# Patient Record
Sex: FEMALE | Race: WHITE | Employment: FULL TIME | URBAN - METROPOLITAN AREA
[De-identification: names, ages, dates, MRNs, and addresses within clinical notes are randomized per-mention and may not be internally consistent; named-entity substitution may affect disease eponyms.]

---

## 2017-02-10 PROBLEM — R09.81 NASAL CONGESTION: Status: ACTIVE | Noted: 2017-02-10

## 2017-02-10 PROBLEM — J34.2 NASAL SEPTAL DEVIATION: Status: ACTIVE | Noted: 2017-02-10

## 2017-09-15 PROBLEM — N32.81 OAB (OVERACTIVE BLADDER): Status: ACTIVE | Noted: 2017-09-15

## 2017-09-15 PROBLEM — N30.10 IC (INTERSTITIAL CYSTITIS): Status: ACTIVE | Noted: 2017-09-15

## 2018-02-16 PROCEDURE — 82607 VITAMIN B-12: CPT | Performed by: INTERNAL MEDICINE

## 2018-02-16 PROCEDURE — 83721 ASSAY OF BLOOD LIPOPROTEIN: CPT | Performed by: INTERNAL MEDICINE

## 2018-02-16 PROCEDURE — 82746 ASSAY OF FOLIC ACID SERUM: CPT | Performed by: INTERNAL MEDICINE

## 2018-03-09 PROBLEM — R09.82 POST-NASAL DRIP: Status: ACTIVE | Noted: 2018-03-09

## 2019-02-22 PROCEDURE — 83721 ASSAY OF BLOOD LIPOPROTEIN: CPT | Performed by: INTERNAL MEDICINE

## 2019-08-01 PROCEDURE — 88175 CYTOPATH C/V AUTO FLUID REDO: CPT | Performed by: OBSTETRICS & GYNECOLOGY

## 2021-04-28 PROBLEM — R09.82 POST-NASAL DRIP: Status: RESOLVED | Noted: 2018-03-09 | Resolved: 2021-04-28

## 2021-04-28 PROBLEM — R09.81 NASAL CONGESTION: Status: RESOLVED | Noted: 2017-02-10 | Resolved: 2021-04-28

## 2025-01-26 ENCOUNTER — HOSPITAL ENCOUNTER (EMERGENCY)
Facility: HOSPITAL | Age: 45
Discharge: HOME | End: 2025-01-27
Attending: STUDENT IN AN ORGANIZED HEALTH CARE EDUCATION/TRAINING PROGRAM
Payer: COMMERCIAL

## 2025-01-26 ENCOUNTER — APPOINTMENT (OUTPATIENT)
Dept: CT IMAGING | Facility: HOSPITAL | Age: 45
End: 2025-01-26
Attending: STUDENT IN AN ORGANIZED HEALTH CARE EDUCATION/TRAINING PROGRAM
Payer: COMMERCIAL

## 2025-01-26 DIAGNOSIS — R51.9 NONINTRACTABLE HEADACHE, UNSPECIFIED CHRONICITY PATTERN, UNSPECIFIED HEADACHE TYPE: Primary | ICD-10-CM

## 2025-01-26 LAB
ALBUMIN SERPL-MCNC: 4 G/DL (ref 3.2–4.8)
ALBUMIN/GLOB SERPL: 1.3 {RATIO} (ref 1–2)
ALP LIVER SERPL-CCNC: 39 U/L
ALT SERPL-CCNC: <7 U/L
ANION GAP SERPL CALC-SCNC: 6 MMOL/L (ref 0–18)
AST SERPL-CCNC: 21 U/L (ref ?–34)
BASOPHILS # BLD AUTO: 0.05 X10(3) UL (ref 0–0.2)
BASOPHILS NFR BLD AUTO: 0.7 %
BILIRUB SERPL-MCNC: 0.4 MG/DL (ref 0.3–1.2)
BUN BLD-MCNC: 14 MG/DL (ref 9–23)
CALCIUM BLD-MCNC: 9.3 MG/DL (ref 8.7–10.6)
CHLORIDE SERPL-SCNC: 104 MMOL/L (ref 98–112)
CO2 SERPL-SCNC: 28 MMOL/L (ref 21–32)
CREAT BLD-MCNC: 0.79 MG/DL
EGFRCR SERPLBLD CKD-EPI 2021: 93 ML/MIN/1.73M2 (ref 60–?)
EOSINOPHIL # BLD AUTO: 0.11 X10(3) UL (ref 0–0.7)
EOSINOPHIL NFR BLD AUTO: 1.6 %
ERYTHROCYTE [DISTWIDTH] IN BLOOD BY AUTOMATED COUNT: 12.6 %
GLOBULIN PLAS-MCNC: 3.1 G/DL (ref 2–3.5)
GLUCOSE BLD-MCNC: 122 MG/DL (ref 70–99)
HCT VFR BLD AUTO: 38 %
HGB BLD-MCNC: 13 G/DL
IMM GRANULOCYTES # BLD AUTO: 0.01 X10(3) UL (ref 0–1)
IMM GRANULOCYTES NFR BLD: 0.1 %
LYMPHOCYTES # BLD AUTO: 2.97 X10(3) UL (ref 1–4)
LYMPHOCYTES NFR BLD AUTO: 42.6 %
MCH RBC QN AUTO: 30.2 PG (ref 26–34)
MCHC RBC AUTO-ENTMCNC: 34.2 G/DL (ref 31–37)
MCV RBC AUTO: 88.2 FL
MONOCYTES # BLD AUTO: 0.78 X10(3) UL (ref 0.1–1)
MONOCYTES NFR BLD AUTO: 11.2 %
NEUTROPHILS # BLD AUTO: 3.05 X10 (3) UL (ref 1.5–7.7)
NEUTROPHILS # BLD AUTO: 3.05 X10(3) UL (ref 1.5–7.7)
NEUTROPHILS NFR BLD AUTO: 43.8 %
OSMOLALITY SERPL CALC.SUM OF ELEC: 288 MOSM/KG (ref 275–295)
PLATELET # BLD AUTO: 314 10(3)UL (ref 150–450)
POTASSIUM SERPL-SCNC: 3.8 MMOL/L (ref 3.5–5.1)
PROT SERPL-MCNC: 7.1 G/DL (ref 5.7–8.2)
RBC # BLD AUTO: 4.31 X10(6)UL
SODIUM SERPL-SCNC: 138 MMOL/L (ref 136–145)
WBC # BLD AUTO: 7 X10(3) UL (ref 4–11)

## 2025-01-26 PROCEDURE — 62270 DX LMBR SPI PNXR: CPT

## 2025-01-26 PROCEDURE — 89050 BODY FLUID CELL COUNT: CPT | Performed by: STUDENT IN AN ORGANIZED HEALTH CARE EDUCATION/TRAINING PROGRAM

## 2025-01-26 PROCEDURE — 80053 COMPREHEN METABOLIC PANEL: CPT | Performed by: STUDENT IN AN ORGANIZED HEALTH CARE EDUCATION/TRAINING PROGRAM

## 2025-01-26 PROCEDURE — 82945 GLUCOSE OTHER FLUID: CPT | Performed by: STUDENT IN AN ORGANIZED HEALTH CARE EDUCATION/TRAINING PROGRAM

## 2025-01-26 PROCEDURE — 87070 CULTURE OTHR SPECIMN AEROBIC: CPT | Performed by: STUDENT IN AN ORGANIZED HEALTH CARE EDUCATION/TRAINING PROGRAM

## 2025-01-26 PROCEDURE — 87205 SMEAR GRAM STAIN: CPT | Performed by: STUDENT IN AN ORGANIZED HEALTH CARE EDUCATION/TRAINING PROGRAM

## 2025-01-26 PROCEDURE — 96375 TX/PRO/DX INJ NEW DRUG ADDON: CPT

## 2025-01-26 PROCEDURE — 96374 THER/PROPH/DIAG INJ IV PUSH: CPT

## 2025-01-26 PROCEDURE — 70498 CT ANGIOGRAPHY NECK: CPT | Performed by: STUDENT IN AN ORGANIZED HEALTH CARE EDUCATION/TRAINING PROGRAM

## 2025-01-26 PROCEDURE — 99285 EMERGENCY DEPT VISIT HI MDM: CPT

## 2025-01-26 PROCEDURE — 85025 COMPLETE CBC W/AUTO DIFF WBC: CPT | Performed by: STUDENT IN AN ORGANIZED HEALTH CARE EDUCATION/TRAINING PROGRAM

## 2025-01-26 PROCEDURE — 70496 CT ANGIOGRAPHY HEAD: CPT | Performed by: STUDENT IN AN ORGANIZED HEALTH CARE EDUCATION/TRAINING PROGRAM

## 2025-01-26 PROCEDURE — 84157 ASSAY OF PROTEIN OTHER: CPT | Performed by: STUDENT IN AN ORGANIZED HEALTH CARE EDUCATION/TRAINING PROGRAM

## 2025-01-26 RX ORDER — LIDOCAINE HYDROCHLORIDE 10 MG/ML
INJECTION, SOLUTION INFILTRATION; PERINEURAL
Status: COMPLETED
Start: 2025-01-26 | End: 2025-01-26

## 2025-01-26 RX ORDER — ACETAMINOPHEN 500 MG
1000 TABLET ORAL ONCE
Status: COMPLETED | OUTPATIENT
Start: 2025-01-26 | End: 2025-01-26

## 2025-01-26 RX ORDER — METOCLOPRAMIDE HYDROCHLORIDE 5 MG/ML
10 INJECTION INTRAMUSCULAR; INTRAVENOUS ONCE
Status: COMPLETED | OUTPATIENT
Start: 2025-01-26 | End: 2025-01-26

## 2025-01-26 RX ORDER — NORGESTIMATE AND ETHINYL ESTRADIOL 0.25-0.035
1 KIT ORAL DAILY
COMMUNITY
Start: 2024-11-03 | End: 2025-01-29 | Stop reason: ALTCHOICE

## 2025-01-26 RX ORDER — DIPHENHYDRAMINE HYDROCHLORIDE 50 MG/ML
25 INJECTION INTRAMUSCULAR; INTRAVENOUS ONCE
Status: COMPLETED | OUTPATIENT
Start: 2025-01-26 | End: 2025-01-26

## 2025-01-26 RX ORDER — LIDOCAINE HYDROCHLORIDE AND EPINEPHRINE 10; 10 MG/ML; UG/ML
INJECTION, SOLUTION INFILTRATION; PERINEURAL
Status: COMPLETED
Start: 2025-01-26 | End: 2025-01-26

## 2025-01-27 VITALS
RESPIRATION RATE: 18 BRPM | OXYGEN SATURATION: 100 % | HEART RATE: 63 BPM | SYSTOLIC BLOOD PRESSURE: 112 MMHG | HEIGHT: 65 IN | BODY MASS INDEX: 19.99 KG/M2 | TEMPERATURE: 98 F | WEIGHT: 120 LBS | DIASTOLIC BLOOD PRESSURE: 72 MMHG

## 2025-01-27 LAB
CLARITY CSF: CLEAR
CLARITY CSF: CLEAR
COLOR CSF: COLORLESS
COLOR CSF: COLORLESS
COUNT PERFORMED ON TUBE: 1
COUNT PERFORMED ON TUBE: 4
GLUCOSE CSF-MCNC: 57 MG/DL (ref 40–70)
PROT PATTERN CSF ELPH-IMP: 39 MG/DL (ref 15–45)
RBC # CSF: 0 /MM3
RBC # CSF: 2 /MM3 (ref ?–1)
TOTAL CELLS COUNTED CSF: 0 /MM3 (ref 0–5)
TOTAL VOLUME CSF: 9 ML

## 2025-01-27 NOTE — ED PROVIDER NOTES
Patient Seen in: [facility] Emergency Department      History     Chief Complaint   Patient presents with    Headache     Stated Complaint: Severe headache, sudden for second time ( 1st time yesterday night, resolved)    Subjective:   HPI      The patient is a 45-year-old female presented to the emergency with reports of headache.  She states she first developed a sudden onset severe headache as she was having sex with her  yesterday.  It was a 7 out of 10 at that time.  Today headache reoccurred again while having sex and was 10 out of 10.  She denies any neurological symptoms.  She denies prior history of headaches or migraines.  She states she has never felt this way before.    Objective:     Past Medical History:    ANXIETY    Anxiety    Depression    IBS              Past Surgical History:   Procedure Laterality Date    Adenoidectomy      Colonoscopy  ,     wnl per pt    Other      interstitial cystitis    Tonsillectomy                  Social History     Socioeconomic History    Marital status:    Tobacco Use    Smoking status: Former     Current packs/day: 0.00     Average packs/day: 1 pack/day for 7.0 years (7.0 ttl pk-yrs)     Types: Cigarettes     Start date: 1997     Quit date: 2004     Years since quittin.0    Smokeless tobacco: Never   Substance and Sexual Activity    Alcohol use: Yes     Alcohol/week: 0.0 standard drinks of alcohol     Comment: socially    Drug use: No    Sexual activity: Yes                  Physical Exam     ED Triage Vitals [25 1857]   /75   Pulse 81   Resp 18   Temp 98 °F (36.7 °C)   Temp src Temporal   SpO2 98 %   O2 Device None (Room air)       Current Vitals:   Vital Signs  BP: 112/72  Pulse: 63  Resp: 18  Temp: 98 °F (36.7 °C)  Temp src: Temporal  MAP (mmHg): 83    Oxygen Therapy  SpO2: 100 %  O2 Device: None (Room air)        Physical Exam  Vitals and nursing note reviewed.   Constitutional:       General: She is not in  acute distress.     Appearance: Normal appearance.   HENT:      Head: Normocephalic.      Nose: Nose normal.      Mouth/Throat:      Mouth: Mucous membranes are moist.   Eyes:      Extraocular Movements: Extraocular movements intact.      Pupils: Pupils are equal, round, and reactive to light.   Cardiovascular:      Rate and Rhythm: Normal rate and regular rhythm.      Pulses: Normal pulses.   Pulmonary:      Effort: Pulmonary effort is normal.   Abdominal:      General: Abdomen is flat. Bowel sounds are normal. There is no distension.      Palpations: Abdomen is soft.      Tenderness: There is no abdominal tenderness. There is no right CVA tenderness, left CVA tenderness, guarding or rebound.      Hernia: No hernia is present.   Musculoskeletal:         General: No swelling or tenderness. Normal range of motion.      Cervical back: Normal range of motion.   Skin:     General: Skin is warm and dry.   Neurological:      Mental Status: She is alert and oriented to person, place, and time. Mental status is at baseline.   Psychiatric:         Mood and Affect: Mood normal.             ED Course     Labs Reviewed   COMP METABOLIC PANEL (14) - Abnormal; Notable for the following components:       Result Value    Glucose 122 (*)     ALT <7 (*)     All other components within normal limits   CELL COUNT, CSF - Abnormal; Notable for the following components:    RBC CSF 2 (*)     All other components within normal limits    Narrative:      Fluid cell count is less than 4/mm3. Differential is not indicated.   GLUCOSE, CEREBROSPINAL FLUID - Normal   PROTEIN, TOTAL, CSF - Normal   CBC WITH DIFFERENTIAL WITH PLATELET   CSF RBC TUBE 1   CSF CULTURE            CTA BRAIN + CTA CAROTIDS (CPT=70496/49545)    Result Date: 1/26/2025  CONCLUSION:  No significant midline shift or mass effect.  No acute intracranial hemorrhage.  If there is persistent clinical concern then consider MRI.  No evidence of large vessel intracranial arterial  occlusion.  No evidence of hemodynamically significant stenosis at either carotid bifurcation based on NASCET criteria.     LOCATION:  [facility]   Dictated by (CST): Garfield Sadler MD on 1/26/2025 at 9:12 PM     Finalized by (CST): Garfield Sadler MD on 1/26/2025 at 9:17 PM            The patient requires an LP to rule out subarachnoid hemorrhage.  Informed consent was obtained.  The standard timeout procedure was completed.  The patient was prepped and draped in sterile fashion.  Landmarks were identified.  Patient's skin was anesthetized with local anesthesia using 2 mL lido with epi.  Needle was not advanced but patient states that she felt warm like she was going to vomit and also folic she needed to have a bowel movement.  Procedure was then halted and patient went to the restroom and had a bowel movement.     The patient was then repositioned.  The prior was used to prep her back was cleaned off with chlorhexidine.  Landmarks were identified again.  The patient was prepped and draped in sterile fashion once again.   Local anesthesia was achieved with 1% Lidocaine.  The ER LP tray was used. A Spinal needed was inserted into a lumbar interspinous space and spinal fluid was drained.  Approximately 4 ml’s of CSF were obtained and sent to the lab.  The fluid appeared clear.  The needle was withdrawn.  There were no complications related to the procedure and the patient tolerated the procedure well.         MDM      Medical Decision Making  The differential includes the following  Post-coital headache, SAH which is a life threat, migraine, anxiety       Pertinent comorbidities include  As listed above     Pertinent social history includes  As listed     Labs  Wbc normal   Hemoglobin 13  Cmp unremarkable     Imaging studies  I reviewed the images and my independent interpreation after review is no evidence of intracranial hemorrhage. Additionaly, I reviewd the radiology report that states the following as detailed  above    External data reviewed    Discussion of management with external providers    ER course  Febrile hemodynamically stable here.  She has no focal neurological deficits on her physical exam.  She was given Reglan and Benadryl with some relief of her pain but she states she was sleeping and now she is awake she still has the discomfort.  Her CT angio was negative for any signs of bleeding or an aneurysm however patient is out of the 6-hour timeframe where CT scan would definitively rule out evidence of intracranial hemorrhage from a subarachnoid bleed.  A long discussion was held with the patient regarding an LP and ultimately shared decision made to perform the procedure.    LP was performed with the above-noted episode as detailed above in the procedure portion of this note.  Currently awaiting results.    12:14 AM  LP negative. Discussed the result with the patient and her . They have been given strict return precautions.       Disposition and Plan     Clinical Impression:  1. Nonintractable headache, unspecified chronicity pattern, unspecified headache type         Disposition:  Discharge  1/27/2025 12:11 am    Follow-up:  Nico Shaw MD  [address] [place] [address] [id number] [phone]    Follow up            Medications Prescribed:  Current Discharge Medication List              Supplementary Documentation:

## 2025-01-29 ENCOUNTER — OFFICE VISIT (OUTPATIENT)
Dept: NEUROLOGY | Facility: CLINIC | Age: 45
End: 2025-01-29
Payer: COMMERCIAL

## 2025-01-29 VITALS
SYSTOLIC BLOOD PRESSURE: 116 MMHG | BODY MASS INDEX: 19 KG/M2 | RESPIRATION RATE: 16 BRPM | DIASTOLIC BLOOD PRESSURE: 64 MMHG | HEART RATE: 76 BPM | WEIGHT: 117 LBS

## 2025-01-29 DIAGNOSIS — G44.82 COITAL HEADACHE: Primary | ICD-10-CM

## 2025-01-29 PROCEDURE — 3078F DIAST BP <80 MM HG: CPT | Performed by: OTHER

## 2025-01-29 PROCEDURE — 99204 OFFICE O/P NEW MOD 45 MIN: CPT | Performed by: OTHER

## 2025-01-29 PROCEDURE — 3074F SYST BP LT 130 MM HG: CPT | Performed by: OTHER

## 2025-01-29 RX ORDER — DESVENLAFAXINE 25 MG/1
TABLET, EXTENDED RELEASE ORAL
COMMUNITY
Start: 2025-01-28

## 2025-01-29 RX ORDER — RIZATRIPTAN BENZOATE 10 MG/1
TABLET ORAL
Qty: 12 TABLET | Refills: 2 | Status: SHIPPED | OUTPATIENT
Start: 2025-01-29

## 2025-01-29 RX ORDER — NORETHINDRONE ACETATE AND ETHINYL ESTRADIOL AND FERROUS FUMARATE 1MG-20(21)
KIT ORAL
COMMUNITY
Start: 2024-10-18

## 2025-01-29 NOTE — PATIENT INSTRUCTIONS
After your visit at the [facility] today,  please direct any follow up questions or medication needs to the staff in our [place] office so that your concerns may be promptly addressed.  We are available through [employer] or at the numbers below:    The phone number is:   [phone] option #1    The fax number is:  [phone]    Your pharmacy should also send any requests electronically to the [place] office.  Refill policies:    Allow 2-3 business days for refills; controlled substances may take longer.  Contact your pharmacy at least 5 days prior to running out of medication and have them send an electronic request or submit request through the “request refill” option in your [employer] account.  Refills are not addressed on weekends; covering physicians do not authorize routine medications on weekends.  No narcotics or controlled substances are refilled after noon on Fridays or by on call physicians.  By law, narcotics must be electronically prescribed.  A 30 day supply with no refills is the maximum allowed.  If your prescription is due for a refill, you may be due for a follow up appointment.  To best provide you care, patients receiving routine medications need to be seen at least once a year.  Patients receiving narcotic/controlled substance medications need to be seen at least once every 3 months.  In the event that your preferred pharmacy does not have the requested medication in stock (e.g. Backordered), it is your responsibility to find another pharmacy that has the requested medication available.  We will gladly send a new prescription to that pharmacy at your request.    Scheduling Tests:    If your physician has ordered radiology tests such as MRI or CT scans, please contact Central Scheduling at [phone] right away to schedule the test.  Once scheduled, the [facility] Centralized Referral Team will work with your insurance carrier to obtain pre-certification or prior authorization.   Depending on your insurance carrier, approval may take 3-10 days.  It is highly recommended patients assure they have received an authorization before having a test performed.  If test is done without insurance authorization, patient may be responsible for the entire amount billed.      Precertification and Prior Authorizations:  If your physician has recommended that you have a procedure or additional testing performed the [facility] Centralized Referral Team will contact your insurance carrier to obtain pre-certification or prior authorization.    You are strongly encouraged to contact your insurance carrier to verify that your procedure/test has been approved and is a COVERED benefit.  Although the [facility] Centralized Referral Team does its due diligence, the insurance carrier gives the disclaimer that \"Although the procedure is authorized, this does not guarantee payment.\"    Ultimately the patient is responsible for payment.   Thank you for your understanding in this matter.  Paperwork Completion:  If you require FMLA or disability paperwork for your recovery, please make sure to either drop it off or have it faxed to our office at [phone]. Be sure the form has your name and date of birth on it.  The form will be faxed to our Forms Department and they will complete it for you.  There is a 25$ fee for all forms that need to be filled out.  Please be aware there is a 10-14 day turnaround time.  You will need to sign a release of information (LESLYE) form if your paperwork does not come with one.  You may call the Forms Department with any questions at [phone].  Their fax number is [phone].

## 2025-01-29 NOTE — H&P
Neurology H&P    Wyatt Kerr Patient Status:  No patient class for patient encounter    1980 MRN [id number]   Location [facility], [address] [place] [address] Attending No att. providers found   Hosp Day # 0 PCP Teo Guevara MD     Subjective:  Wyatt Kerr is a(n) 45 year old female with anxiety comes to neurology clinic for evaluation of headaches. She states that 4 days ago she was having intercourse with her  and started to get a headache. This was like a ring around her head. And the top of the head.This was about an 8/10 on the pain scale,. She had some sensitivity to light with this. She had no nausea or vomiting but felt \"off\". She layed down for 45 minutes and it resolved. She states that the next day she and her  tried again and the headache came back. She went to the ED and got a CTA of the head and neck which was normal as reported below. This was also a painful headache and got a \"migraine cocktail\" which helped reduce her pain but did not completely resolve her headaches. She has no h/o migraine. She has a father and sister with migraines. She did just start taking birthcontrol and desvenlafaxine. She denies any vision changes or numbness,weakness or tingling     Current Medications:  Current Outpatient Medications   Medication Sig Dispense Refill    desvenlafaxine ER 25 MG Oral Tablet 24 Hr       AUROVELA FE  1-20 MG-MCG Oral Tab ONE PILL BY MOUTH DAILY CONTINUOUS DOSING.      cetirizine 10 MG Oral Tab Take 1 tablet (10 mg total) by mouth daily.         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    Chronic allergic conjunctivitis    Reactive airway disease (HCC)    Displacement of lumbar intervertebral disc without myelopathy    Health care maintenance    Visit for screening mammogram     Nasal septal deviation    IC (interstitial cystitis)    OAB (overactive bladder)       PMHx:  Past Medical History:    ANXIETY    Anxiety    Depression    IBS       PSHx:  Past Surgical History:   Procedure Laterality Date    Adenoidectomy      Colonoscopy  ,     wnl per pt    Other      interstitial cystitis    Tonsillectomy         SocHx:  Social History     Socioeconomic History    Marital status:    Tobacco Use    Smoking status: Former     Current packs/day: 0.00     Average packs/day: 1 pack/day for 7.0 years (7.0 ttl pk-yrs)     Types: Cigarettes     Start date: 1997     Quit date: 2004     Years since quittin.0    Smokeless tobacco: Never   Vaping Use    Vaping status: Never Used   Substance and Sexual Activity    Alcohol use: Yes     Alcohol/week: 0.0 standard drinks of alcohol     Comment: socially    Drug use: No    Sexual activity: Yes   Other Topics Concern    Caffeine Concern Yes    Exercise Yes     Comment: 2-3 times per week, running       Family History:  Family History   Problem Relation Age of Onset    Gastro-Intestinal Disorder Mother     Breast Cancer Mother 50        DX AGE 50    Other (Other) Mother     Hypertension Father     Kidney Disease Father         stones    Other (Other) Father     Hypertension Maternal Grandmother     Cancer Maternal Grandmother         appendicele    Heart Disease Paternal Grandmother     Diabetes Paternal Grandmother     Stroke Paternal Grandmother     Heart Disease Paternal Grandfather     Cancer Paternal Grandfather         pancreatic    Heart Disorder Sister         MVP           ROS:  10 point ROS completed and was negative, except for pertinent positive and negatives stated in subjective.    Objective/Physical Exam:    Vital Signs:  Blood pressure 116/64, pulse 76, resp. rate 16, weight 117 lb (53.1 kg), last menstrual period 2024, not currently breastfeeding.    Gen: Awake and in no apparent distress  HEENT: moist  mucus membranes  Neck: Supple  Cardiovascular: Regular rate and rhythm, no murmur  Pulm: CTAB  GI: non-tender, normal bowel sounds  Skin: normal, dry  Extremities: No clubbing or cyanosis      Neurologic:   MENTAL STATUS: alert, ox3, normal attention, language and fund of knowledge.      CRANIAL NERVES II to XII: PERRLA, no ptosis or diplopia, EOM intact, facial sensation intact, strong eye closure, face is symmetric, no dysarthria, tongue midline,  no tongue fasciculations or atrophy, strong shoulder shrug.    MOTOR EXAMINATION: normal tone, no fasciculations, normal strength throughout in UEs and LEs      SENSORY EXAMINATION:  UE: intact to light touch, pinprick intact  LE: intact to light touch, pinprick intact    COORDINATION:  No dysmetria, or intention tremors     REFLEXES: 2+ at biceps, 2+ brachioradialis, 2+ at patella     GAIT: normal stance, normal toe gait and heel gait, tandem well.    Romberg's: negative        Labs:       Imaging:  CTA head and neck 1/26/25  CONCLUSION:  No significant midline shift or mass effect.  No acute intracranial hemorrhage.  If there is persistent clinical concern then consider MRI.      No evidence of large vessel intracranial arterial occlusion.      No evidence of hemodynamically significant stenosis at either carotid bifurcation based on NASCET criteria.     Assessment:  This is a 46 y/o female with 2 episodes of  coital migraine.  She has no history of migraine but her father and sister have migraines and she has started perimenopause and possibly this is causing hormonal fluctuations which may have triggered her recent headaches. She has normal nonfocal neurologic examination today.  I did personally review her CT of the head and neck and she has no evidence of any aneurysm or significant flow-limiting stenosis.  No intracranial abnormalities noted.  We can try rizatriptan for her occasional headache.  If needed we can consider a daily prophylactic though at this time  with only 2 events we will try an as needed abortive for now.      Plan:  Coital headache  - Rizatrtipan for coital headache if needed  - CTA head and neck was reviewed    Hung Arndt DO  Neurology

## 2025-04-28 ENCOUNTER — PATIENT MESSAGE (OUTPATIENT)
Dept: NEUROLOGY | Facility: CLINIC | Age: 45
End: 2025-04-28

## 2025-04-29 NOTE — TELEPHONE ENCOUNTER
Now.  She is doing better and she has not needed to use the rizatriptan we can hold on the follow-up.  I will stop the rizatriptan but she will need to see me if we need to reorder this or if the headaches come back.